# Patient Record
Sex: FEMALE | Race: WHITE
[De-identification: names, ages, dates, MRNs, and addresses within clinical notes are randomized per-mention and may not be internally consistent; named-entity substitution may affect disease eponyms.]

---

## 2020-10-29 PROBLEM — Z00.00 ENCOUNTER FOR PREVENTIVE HEALTH EXAMINATION: Status: ACTIVE | Noted: 2020-10-29

## 2020-11-17 ENCOUNTER — APPOINTMENT (OUTPATIENT)
Dept: INTERNAL MEDICINE | Facility: CLINIC | Age: 65
End: 2020-11-17
Payer: COMMERCIAL

## 2020-11-17 VITALS
BODY MASS INDEX: 30.12 KG/M2 | SYSTOLIC BLOOD PRESSURE: 126 MMHG | DIASTOLIC BLOOD PRESSURE: 88 MMHG | HEIGHT: 63 IN | WEIGHT: 170 LBS

## 2020-11-17 DIAGNOSIS — U07.1 COVID-19: ICD-10-CM

## 2020-11-17 DIAGNOSIS — H01.009 UNSPECIFIED BLEPHARITIS UNSPECIFIED EYE, UNSPECIFIED EYELID: ICD-10-CM

## 2020-11-17 DIAGNOSIS — Z78.9 OTHER SPECIFIED HEALTH STATUS: ICD-10-CM

## 2020-11-17 DIAGNOSIS — H00.019 HORDEOLUM EXTERNUM UNSPECIFIED EYE, UNSPECIFIED EYELID: ICD-10-CM

## 2020-11-17 DIAGNOSIS — Z87.891 PERSONAL HISTORY OF NICOTINE DEPENDENCE: ICD-10-CM

## 2020-11-17 DIAGNOSIS — Z82.49 FAMILY HISTORY OF ISCHEMIC HEART DISEASE AND OTHER DISEASES OF THE CIRCULATORY SYSTEM: ICD-10-CM

## 2020-11-17 DIAGNOSIS — Z87.39 PERSONAL HISTORY OF OTHER DISEASES OF THE MUSCULOSKELETAL SYSTEM AND CONNECTIVE TISSUE: ICD-10-CM

## 2020-11-17 PROCEDURE — 99213 OFFICE O/P EST LOW 20 MIN: CPT

## 2020-11-17 NOTE — REVIEW OF SYSTEMS
[Discharge] : discharge [Pain] : pain [Redness] : redness [Itching] : itching [Fever] : no fever [Earache] : no earache [Nasal Discharge] : no nasal discharge

## 2020-11-17 NOTE — HISTORY OF PRESENT ILLNESS
[de-identified] : 66 y/o female is in the office for left eye irritation, swelling, and redness for 3 days.She has been using warm compresses with some improvement.

## 2020-11-17 NOTE — PHYSICAL EXAM
[Normal] : no acute distress, well nourished, well developed and well-appearing [PERRL] : pupils equal round and reactive to light [EOMI] : extraocular movements intact [Normal Outer Ear/Nose] : the outer ears and nose were normal in appearance [Normal Oropharynx] : the oropharynx was normal [No Lymphadenopathy] : no lymphadenopathy [Scoliosis] : scoliosis [de-identified] : Left lower lid inflammed and swollen

## 2020-12-31 ENCOUNTER — APPOINTMENT (OUTPATIENT)
Dept: INTERNAL MEDICINE | Facility: CLINIC | Age: 65
End: 2020-12-31
Payer: MEDICAID

## 2020-12-31 VITALS
DIASTOLIC BLOOD PRESSURE: 70 MMHG | HEIGHT: 63 IN | SYSTOLIC BLOOD PRESSURE: 130 MMHG | WEIGHT: 170 LBS | BODY MASS INDEX: 30.12 KG/M2

## 2020-12-31 DIAGNOSIS — L03.90 CELLULITIS, UNSPECIFIED: ICD-10-CM

## 2020-12-31 PROCEDURE — 99213 OFFICE O/P EST LOW 20 MIN: CPT

## 2020-12-31 PROCEDURE — 99072 ADDL SUPL MATRL&STAF TM PHE: CPT

## 2020-12-31 RX ORDER — DOXYCYCLINE HYCLATE 100 MG/1
100 CAPSULE ORAL
Qty: 30 | Refills: 0 | Status: ACTIVE | COMMUNITY
Start: 2020-12-31 | End: 1900-01-01

## 2020-12-31 NOTE — REVIEW OF SYSTEMS
[Fever] : no fever [Chest Pain] : no chest pain [Shortness Of Breath] : no shortness of breath [Abdominal Pain] : no abdominal pain [Skin Rash] : skin rash

## 2020-12-31 NOTE — HISTORY OF PRESENT ILLNESS
[de-identified] : 64 y/o female presents for follow up and prescription renewals. Pt also c/o right thigh redness and swelling for 3 weeks.  She had what she thought was infected hair follicle that she treated topically and with doxycycline for about 5 days.  It has slowly but steadily improved.

## 2020-12-31 NOTE — PHYSICAL EXAM
[Normal] : no acute distress, well nourished, well developed and well-appearing [No Lymphadenopathy] : no lymphadenopathy [Clear to Auscultation] : lungs were clear to auscultation bilaterally [Normal Rate] : normal rate  [Regular Rhythm] : with a regular rhythm [de-identified] : Right thigh with firm nontender S/Q swelling

## 2020-12-31 NOTE — PLAN
[FreeTextEntry1] : The right thigh infection seems to be resolving.\par She was given doxycycline 1 mg #20 to have if it seems like it is worsening again.\par \par Norco 7.5 #120 renewed

## 2021-01-29 ENCOUNTER — RX RENEWAL (OUTPATIENT)
Age: 66
End: 2021-01-29

## 2021-02-19 ENCOUNTER — APPOINTMENT (OUTPATIENT)
Dept: INTERNAL MEDICINE | Facility: CLINIC | Age: 66
End: 2021-02-19

## 2021-02-25 ENCOUNTER — APPOINTMENT (OUTPATIENT)
Dept: INTERNAL MEDICINE | Facility: CLINIC | Age: 66
End: 2021-02-25
Payer: COMMERCIAL

## 2021-02-25 VITALS
DIASTOLIC BLOOD PRESSURE: 82 MMHG | SYSTOLIC BLOOD PRESSURE: 142 MMHG | BODY MASS INDEX: 30.12 KG/M2 | HEIGHT: 63 IN | WEIGHT: 170 LBS

## 2021-02-25 PROCEDURE — 99072 ADDL SUPL MATRL&STAF TM PHE: CPT

## 2021-02-25 PROCEDURE — 99214 OFFICE O/P EST MOD 30 MIN: CPT

## 2021-02-25 NOTE — PLAN
[FreeTextEntry1] : Chronic back pain/scoliosis/fibromyalgia–symptoms have been stable and Vicodin 7.5 mg #120 was renewed.\par \par Asthma–her symptoms have been well controlled lately and she will continue with Advair, Ventolin and Singulair.\par \par Paroxysmal A–fib–she continues with verapamil and is scheduled to see an arrhythmia specialist soon.\par \par She has a history of Covid in March 2020.  Feels that she has recovered but it did take an extended period of time.

## 2021-02-25 NOTE — REVIEW OF SYSTEMS
[Palpitations] : palpitations [Joint Pain] : joint pain [Joint Stiffness] : joint stiffness [Back Pain] : back pain [Fever] : no fever [Chills] : no chills [Chest Pain] : no chest pain [Shortness Of Breath] : no shortness of breath [Wheezing] : no wheezing [Cough] : no cough [Joint Swelling] : no joint swelling [Muscle Weakness] : no muscle weakness [Headache] : no headache [Dizziness] : no dizziness [Fainting] : no fainting

## 2021-02-25 NOTE — HISTORY OF PRESENT ILLNESS
[de-identified] : 66 y/o female presents for follow up and prescription renewal.  She has a history of chronic pain secondary to back arthritis, scoliosis as well as fibromyalgia.  Her pain has been well controlled on Vicodin on an as-needed basis.\par Her asthma has been controlled with Advair and Ventolin as well as Singulair daily.\par She continues to see the cardiologist for paroxysmal A-. fib and A- flutter and is scheduled to see an arrhythmia specialist soon to discuss the option of ablation.  She is on verapamil and generally has episodes on a 1 to 2-week basis.

## 2021-03-30 ENCOUNTER — APPOINTMENT (OUTPATIENT)
Dept: INTERNAL MEDICINE | Facility: CLINIC | Age: 66
End: 2021-03-30
Payer: MEDICAID

## 2021-03-30 VITALS
SYSTOLIC BLOOD PRESSURE: 130 MMHG | DIASTOLIC BLOOD PRESSURE: 90 MMHG | WEIGHT: 170 LBS | HEIGHT: 63 IN | BODY MASS INDEX: 30.12 KG/M2

## 2021-03-30 VITALS — SYSTOLIC BLOOD PRESSURE: 130 MMHG | DIASTOLIC BLOOD PRESSURE: 84 MMHG

## 2021-03-30 PROCEDURE — 99214 OFFICE O/P EST MOD 30 MIN: CPT

## 2021-03-30 PROCEDURE — 99072 ADDL SUPL MATRL&STAF TM PHE: CPT

## 2021-03-30 RX ORDER — ALBUTEROL 90 MCG
AEROSOL (GRAM) INHALATION
Refills: 0 | Status: DISCONTINUED | COMMUNITY
End: 2021-03-30

## 2021-03-30 RX ORDER — PREDNISONE 10 MG/1
10 TABLET ORAL
Qty: 50 | Refills: 0 | Status: ACTIVE | COMMUNITY
Start: 2021-03-30 | End: 1900-01-01

## 2021-03-30 NOTE — HISTORY OF PRESENT ILLNESS
[de-identified] : 66 y/o female presents for follow-up of chronic back pain as well as her allergies/asthma symptoms.  There have been somewhat worse lately and needs refill on her medications.  She been seeing a cardiologist for her paroxysmal atrial fibrillation and was referred to an electrophysiologist who felt an ablation was not needed but her cardiologist has now sent the for a second opinion to someone else.

## 2021-03-30 NOTE — PHYSICAL EXAM
[No Lymphadenopathy] : no lymphadenopathy [Clear to Auscultation] : lungs were clear to auscultation bilaterally [Normal Rate] : normal rate  [Regular Rhythm] : with a regular rhythm [No Edema] : there was no peripheral edema [No Joint Swelling] : no joint swelling [No Focal Deficits] : no focal deficits [Normal] : affect was normal and insight and judgment were intact

## 2021-03-30 NOTE — REVIEW OF SYSTEMS
[Joint Pain] : joint pain [Muscle Pain] : muscle pain [Back Pain] : back pain [Headache] : headache [Fever] : no fever [Chills] : no chills [Chest Pain] : no chest pain [Palpitations] : no palpitations [Lower Ext Edema] : no lower extremity edema [Shortness Of Breath] : no shortness of breath [Joint Swelling] : no joint swelling [Muscle Weakness] : no muscle weakness [Skin Rash] : no skin rash [Dizziness] : no dizziness [Fainting] : no fainting

## 2021-03-30 NOTE — PLAN
[FreeTextEntry1] : History of fibromyalgia/scoliosis/chronic pain–her symptoms have been relatively stable but do seem to fluctuate based on the amount of time she works and spends on her feet.  Norco 7.5 #120 was renewed.\par \par Asthma/allergies she has somewhat increased symptoms lately but she will continue with albuterol HFA/Singulair 10 mg/Symbicort or Advair and will add Patanol drops for eye symptoms.  She is also given a refill for prednisone 10 mg #30 that she uses when needed.\par \par History of paroxysmal atrial fibrillation–she will follow up with a cardiologist and electrophysiologist.  Verapamil  was renewed.

## 2021-06-02 ENCOUNTER — APPOINTMENT (OUTPATIENT)
Dept: INTERNAL MEDICINE | Facility: CLINIC | Age: 66
End: 2021-06-02
Payer: COMMERCIAL

## 2021-06-02 VITALS — WEIGHT: 173 LBS | BODY MASS INDEX: 30.65 KG/M2 | HEIGHT: 63 IN

## 2021-06-02 VITALS — SYSTOLIC BLOOD PRESSURE: 134 MMHG | DIASTOLIC BLOOD PRESSURE: 84 MMHG

## 2021-06-02 PROCEDURE — 99072 ADDL SUPL MATRL&STAF TM PHE: CPT

## 2021-06-02 PROCEDURE — 36415 COLL VENOUS BLD VENIPUNCTURE: CPT

## 2021-06-02 PROCEDURE — 99396 PREV VISIT EST AGE 40-64: CPT | Mod: 25

## 2021-06-02 NOTE — HEALTH RISK ASSESSMENT
[No] : In the past 12 months have you used drugs other than those required for medical reasons? No [No falls in past year] : Patient reported no falls in the past year [0] : 2) Feeling down, depressed, or hopeless: Not at all (0) [Patient reported mammogram was normal] : Patient reported mammogram was normal [] : No [[id number]] : 0 [MammogramDate] : 08/19

## 2021-06-02 NOTE — PHYSICAL EXAM
[No Acute Distress] : no acute distress [No JVD] : no jugular venous distention [No Lymphadenopathy] : no lymphadenopathy [Thyroid Normal, No Nodules] : the thyroid was normal and there were no nodules present [Clear to Auscultation] : lungs were clear to auscultation bilaterally [Normal] : normal rate, regular rhythm, normal S1 and S2 and no murmur heard [No Carotid Bruits] : no carotid bruits [No Edema] : there was no peripheral edema [Non Tender] : non-tender [No Joint Swelling] : no joint swelling [No Focal Deficits] : no focal deficits [Alert and Oriented x3] : oriented to person, place, and time [de-identified] : Scoliosis changes

## 2021-06-02 NOTE — PLAN
[FreeTextEntry1] : Her exam is essentially unchanged and significant mostly for her significant scoliosis.\par Fasting labs including thyroid function, A1c, vitamin D and Covid antibody were sent.\par \par Chronic back pain/scoliosis/fibromyalgia–her pain has been relatively stable but it does wax and wane based on her level of activity.  Norco 7.5 #120 was renewed.  She uses medication as prescribed without any problems in the past.\par \par Asthma symptoms are well controlled with Singulair, Advair or Symbicort and albuterol as needed.\par \par Paroxysmal A. fib–she has had no episodes recently and remains on verapamil ER one eighty daily.  She follows with a cardiologist and is also scheduled to see a different arrhythmia specialist to discuss the possibility of ablation.\par \par Colonoscopy was again discussed and suggested.  She does intend to do in the near future.\par \par Yearly GYN/mammography was again discussed and encouraged.  Last mammography was about 2 years ago that was normal.

## 2021-06-03 LAB
25(OH)D3 SERPL-MCNC: 58 NG/ML
ALBUMIN SERPL ELPH-MCNC: 4.1 G/DL
ALP BLD-CCNC: 83 U/L
ALT SERPL-CCNC: 14 U/L
ANION GAP SERPL CALC-SCNC: 12 MMOL/L
AST SERPL-CCNC: 16 U/L
BASOPHILS # BLD AUTO: 0.06 K/UL
BASOPHILS NFR BLD AUTO: 0.8 %
BILIRUB SERPL-MCNC: 0.4 MG/DL
BUN SERPL-MCNC: 10 MG/DL
CALCIUM SERPL-MCNC: 9.3 MG/DL
CHLORIDE SERPL-SCNC: 102 MMOL/L
CHOLEST SERPL-MCNC: 179 MG/DL
CO2 SERPL-SCNC: 25 MMOL/L
COVID-19 NUCLEOCAPSID  GAM ANTIBODY INTERPRETATION: POSITIVE
CREAT SERPL-MCNC: 0.5 MG/DL
EOSINOPHIL # BLD AUTO: 0.12 K/UL
EOSINOPHIL NFR BLD AUTO: 1.6 %
ESTIMATED AVERAGE GLUCOSE: 111 MG/DL
GLUCOSE SERPL-MCNC: 82 MG/DL
HBA1C MFR BLD HPLC: 5.5 %
HCT VFR BLD CALC: 39 %
HDLC SERPL-MCNC: 73 MG/DL
HGB BLD-MCNC: 12.7 G/DL
IMM GRANULOCYTES NFR BLD AUTO: 0.1 %
LDLC SERPL CALC-MCNC: 89 MG/DL
LYMPHOCYTES # BLD AUTO: 1.66 K/UL
LYMPHOCYTES NFR BLD AUTO: 22.2 %
MAN DIFF?: NORMAL
MCHC RBC-ENTMCNC: 30.4 PG
MCHC RBC-ENTMCNC: 32.6 GM/DL
MCV RBC AUTO: 93.3 FL
MONOCYTES # BLD AUTO: 0.5 K/UL
MONOCYTES NFR BLD AUTO: 6.7 %
NEUTROPHILS # BLD AUTO: 5.13 K/UL
NEUTROPHILS NFR BLD AUTO: 68.6 %
NONHDLC SERPL-MCNC: 106 MG/DL
PLATELET # BLD AUTO: 264 K/UL
POTASSIUM SERPL-SCNC: 4 MMOL/L
PROT SERPL-MCNC: 6.2 G/DL
RBC # BLD: 4.18 M/UL
RBC # FLD: 13.1 %
SARS-COV-2 AB SERPL QL IA: 53.5 INDEX
SODIUM SERPL-SCNC: 139 MMOL/L
T3 SERPL-MCNC: 124 NG/DL
T4 SERPL-MCNC: 6.8 UG/DL
TRIGL SERPL-MCNC: 84 MG/DL
TSH SERPL-ACNC: 0.97 UIU/ML
WBC # FLD AUTO: 7.48 K/UL

## 2021-07-19 ENCOUNTER — APPOINTMENT (OUTPATIENT)
Dept: INTERNAL MEDICINE | Facility: CLINIC | Age: 66
End: 2021-07-19

## 2021-07-21 ENCOUNTER — APPOINTMENT (OUTPATIENT)
Dept: INTERNAL MEDICINE | Facility: CLINIC | Age: 66
End: 2021-07-21
Payer: COMMERCIAL

## 2021-07-21 VITALS — BODY MASS INDEX: 30.65 KG/M2 | HEIGHT: 63 IN | WEIGHT: 173 LBS

## 2021-07-21 DIAGNOSIS — R06.00 DYSPNEA, UNSPECIFIED: ICD-10-CM

## 2021-07-21 PROCEDURE — 99214 OFFICE O/P EST MOD 30 MIN: CPT

## 2021-07-21 PROCEDURE — 99072 ADDL SUPL MATRL&STAF TM PHE: CPT

## 2021-07-21 NOTE — REVIEW OF SYSTEMS
[Fever] : no fever [Itching] : itching [Nasal Discharge] : nasal discharge [Chest Pain] : no chest pain [Shortness Of Breath] : shortness of breath [Wheezing] : no wheezing [Cough] : cough [Abdominal Pain] : no abdominal pain [Joint Pain] : joint pain [Back Pain] : back pain [Headache] : no headache [Dizziness] : no dizziness [FreeTextEntry6] : Occasional yellow sputum

## 2021-07-21 NOTE — ASSESSMENT
[FreeTextEntry1] : Chronic back pain/from myalgia–Norco 7.5 #120 was renewed.  Pain pattern has remained essentially unchanged but she does have occasional flareups.  She gets good relief with the medication and she uses without any difficulties in the past.\par \par Asthma/dyspnea–her exam is unremarkable without any audible wheezing.  She was told some of her shortness of breath could be post Covid related.  Her recent cardiac work-up was negative.  She is also told that due to her scoliosis she might have some diminished lung function and could see a pulmonologist to have it evaluated but she wants to hold off for the time being.  For now she will continue to use her Advair and albuterol and Singulair.\par \par Allergic rhinitis/conjunctivitis–she is going to continue with Pataday ophthalmic solution daily along with Flonase as needed.  She has gotten good relief both these medications in the past.\par \par Paroxysmal atrial fibrillation–she still has occasional episodes of a–fib but they seem generally well controlled.  He does have an appointment to see an arrhythmia specialist but she is not interested at this time of entertaining an ablation.\par \par A total of 30 minutes was spent on this encounter.

## 2021-07-21 NOTE — HISTORY OF PRESENT ILLNESS
[de-identified] : 65 y/o presents for follow up and prescription renewal.\par She also complains of some intermittent shortness of breath not associated with any chest pain or episodes of atrial fibrillation.  She does have an ongoing cough and postnasal drip that is occasionally productive of yellowish sputum.  She denies any fever chills or increased wheezing as of late.  Her asthma has been well controlled.

## 2021-07-21 NOTE — PHYSICAL EXAM
[No Acute Distress] : no acute distress [No JVD] : no jugular venous distention [No Respiratory Distress] : no respiratory distress  [Clear to Auscultation] : lungs were clear to auscultation bilaterally [Normal Rate] : normal rate  [Regular Rhythm] : with a regular rhythm [Normal S1, S2] : normal S1 and S2 [No Edema] : there was no peripheral edema

## 2021-07-27 ENCOUNTER — TRANSCRIPTION ENCOUNTER (OUTPATIENT)
Age: 66
End: 2021-07-27

## 2021-07-29 ENCOUNTER — NON-APPOINTMENT (OUTPATIENT)
Age: 66
End: 2021-07-29

## 2021-08-02 ENCOUNTER — APPOINTMENT (OUTPATIENT)
Dept: INTERNAL MEDICINE | Facility: CLINIC | Age: 66
End: 2021-08-02
Payer: COMMERCIAL

## 2021-08-02 VITALS
DIASTOLIC BLOOD PRESSURE: 80 MMHG | SYSTOLIC BLOOD PRESSURE: 120 MMHG | WEIGHT: 173 LBS | BODY MASS INDEX: 30.65 KG/M2 | OXYGEN SATURATION: 97 % | HEIGHT: 63 IN

## 2021-08-02 DIAGNOSIS — J98.01 ACUTE BRONCHOSPASM: ICD-10-CM

## 2021-08-02 PROCEDURE — 99213 OFFICE O/P EST LOW 20 MIN: CPT

## 2021-08-02 NOTE — PHYSICAL EXAM
[No Acute Distress] : no acute distress [No Lymphadenopathy] : no lymphadenopathy [No Respiratory Distress] : no respiratory distress  [Normal Rate] : normal rate  [Regular Rhythm] : with a regular rhythm [No Edema] : there was no peripheral edema [de-identified] : Bilateral upper expiratory wheezing

## 2021-08-02 NOTE — REVIEW OF SYSTEMS
[Shortness Of Breath] : shortness of breath [Wheezing] : wheezing [Cough] : cough [Headache] : headache [Fever] : no fever [Chills] : no chills [Chest Pain] : no chest pain [Dyspnea on Exertion] : no dyspnea on exertion [Abdominal Pain] : no abdominal pain [Diarrhea] : diarrhea [Dizziness] : no dizziness [Fainting] : no fainting

## 2021-08-02 NOTE — HISTORY OF PRESENT ILLNESS
[de-identified] : 67 y/o presents complaining of persistent wheezing over the past 10 days.  She started prednisone 40 mg on 725 and then was seen at a walk-in urgent care on 727 and had a chest x-ray done that revealed mostly atelectasis but no obvious infiltrate or consolidation.  She continue with prednisone 40 mg for another 2 days and is now tapered down to 10 mg.  She is also been on doxycycline for 1 week with no change.  The cough and wheezing has persisted.  She denies any fever or chills or significant sputum.  She also recently found out that there was mold in her ceiling which possibly may be contributing.

## 2021-08-02 NOTE — ASSESSMENT
[FreeTextEntry1] : Tracheobronchitis with persistent bronchospasm–she was told is possibly due to an allergy reaction to mold or could have been viral in origin.  She is taking the doxycycline for 1 week with no change and was told to stop at this point.  For the persistent bronchospasm she is going to increase prednisone to 60 mg for the next 3 to 4 days and only start to decrease it when she feels the wheezing has improved.  She is going to call if she has any issues.  Once she improves she will then start a slow taper by 10 mg every 3 to 4 days based on response.  She will follow-up if there is any problems.

## 2021-09-01 ENCOUNTER — APPOINTMENT (OUTPATIENT)
Dept: INTERNAL MEDICINE | Facility: CLINIC | Age: 66
End: 2021-09-01
Payer: COMMERCIAL

## 2021-09-01 VITALS
BODY MASS INDEX: 30.65 KG/M2 | SYSTOLIC BLOOD PRESSURE: 122 MMHG | HEIGHT: 63 IN | WEIGHT: 173 LBS | DIASTOLIC BLOOD PRESSURE: 60 MMHG

## 2021-09-01 DIAGNOSIS — K21.9 GASTRO-ESOPHAGEAL REFLUX DISEASE W/OUT ESOPHAGITIS: ICD-10-CM

## 2021-09-01 PROCEDURE — 99213 OFFICE O/P EST LOW 20 MIN: CPT

## 2021-09-01 NOTE — REVIEW OF SYSTEMS
[Heartburn] : heartburn [Fever] : no fever [Chills] : no chills [Chest Pain] : no chest pain [Shortness Of Breath] : no shortness of breath [Wheezing] : no wheezing [Cough] : no cough [Dyspnea on Exertion] : no dyspnea on exertion [Abdominal Pain] : no abdominal pain [Diarrhea] : diarrhea [Headache] : no headache [Dizziness] : no dizziness [Fainting] : no fainting

## 2021-09-01 NOTE — HISTORY OF PRESENT ILLNESS
[de-identified] : 67 y/o presents for follow up and prescription renewal. \par Her recent asthma exacerbation has resolved with a course of prednisone. She did experience some reflux symptoms while taking prednisone.  She has been feeling generally well otherwise other than some recent headaches and ongoing allergy symptoms.  Back pain issues are essentially unchanged.

## 2021-09-01 NOTE — PHYSICAL EXAM
[No Acute Distress] : no acute distress [No Lymphadenopathy] : no lymphadenopathy [No Respiratory Distress] : no respiratory distress  [Clear to Auscultation] : lungs were clear to auscultation bilaterally [Normal Rate] : normal rate  [Regular Rhythm] : with a regular rhythm [No Edema] : there was no peripheral edema

## 2021-09-01 NOTE — ASSESSMENT
[FreeTextEntry1] : Chronic back pain/fibromyalgia/scoliosis–Norco 7.5 #120 was renewed.  There has been no significant change in her symptom pattern.\par \par Recent GERD–she was told is very possible prednisone caused some increased reflux symptoms and she was told to use Pepcid 20 mg twice daily as needed.\par \par Paroxysmal atrial fibrillation–she has had recent episode that lasted a few hours but resolved spontaneously.  She has an appointment to see electrophysiologist in the next 2 weeks.

## 2021-09-21 ENCOUNTER — APPOINTMENT (OUTPATIENT)
Dept: INTERNAL MEDICINE | Facility: CLINIC | Age: 66
End: 2021-09-21
Payer: COMMERCIAL

## 2021-09-21 VITALS — DIASTOLIC BLOOD PRESSURE: 70 MMHG | SYSTOLIC BLOOD PRESSURE: 130 MMHG

## 2021-09-21 DIAGNOSIS — H10.10 ACUTE ATOPIC CONJUNCTIVITIS, UNSPECIFIED EYE: ICD-10-CM

## 2021-09-21 PROCEDURE — 99213 OFFICE O/P EST LOW 20 MIN: CPT

## 2021-09-21 RX ORDER — AZELASTINE HYDROCHLORIDE 137 UG/1
0.1 SPRAY, METERED NASAL
Qty: 1 | Refills: 2 | Status: ACTIVE | COMMUNITY
Start: 2021-09-21 | End: 1900-01-01

## 2021-09-21 NOTE — HISTORY OF PRESENT ILLNESS
[FreeTextEntry8] : Patient presents with c/o persistent/increased allergy symptoms with congestion, postnasal drip, irritated eyes.  She denies any wheezing and overall drainage/mucus is clear.\par Separately she also had recent evaluation by an electrophysiology specialist and for now she is going to remain on verapamil for rate control.  He did not feel there was any need for an ablation at this time.

## 2021-09-21 NOTE — PHYSICAL EXAM
[No Acute Distress] : no acute distress [Clear to Auscultation] : lungs were clear to auscultation bilaterally [Normal Rate] : normal rate  [Regular Rhythm] : with a regular rhythm [No Edema] : there was no peripheral edema [de-identified] : Sclera/conjunctiva slightly injected

## 2021-09-21 NOTE — REVIEW OF SYSTEMS
[Itching] : itching [Nasal Discharge] : nasal discharge [Postnasal Drip] : postnasal drip [Fever] : no fever [Chills] : no chills [Chest Pain] : no chest pain [Shortness Of Breath] : no shortness of breath [Wheezing] : no wheezing

## 2021-09-21 NOTE — ASSESSMENT
[FreeTextEntry1] : Allergic rhinitis/conjunctivitis–she has had minimal improvement with Singulair and her nasal sprays.\par She is going to use prednisone 20 mg daily for next 2 to 3 days and then decrease it down to 10 for a few more days and then stop.\par \par Chronic back pain–is no change in his symptoms.  Vicodin 7.5 #120 was renewed.  She is knows it is early and she will probably have to wait about a week to build to fill the prescription.  She has not run out of pills she would just like to refill it now that she is here.\par \par Paroxysmal atrial fibrillation–she had follow-up with her electrophysiology specialist.  At this point she is going continue with verapamil for rate control.  He felt there was no indication for an ablation.  She is going to remain on aspirin on a daily basis as well.

## 2021-10-14 ENCOUNTER — RX RENEWAL (OUTPATIENT)
Age: 66
End: 2021-10-14

## 2021-11-08 ENCOUNTER — NON-APPOINTMENT (OUTPATIENT)
Age: 66
End: 2021-11-08

## 2021-11-08 ENCOUNTER — APPOINTMENT (OUTPATIENT)
Dept: INTERNAL MEDICINE | Facility: CLINIC | Age: 66
End: 2021-11-08
Payer: COMMERCIAL

## 2021-11-08 VITALS
BODY MASS INDEX: 30.65 KG/M2 | WEIGHT: 173 LBS | HEIGHT: 63 IN | DIASTOLIC BLOOD PRESSURE: 70 MMHG | SYSTOLIC BLOOD PRESSURE: 100 MMHG

## 2021-11-08 DIAGNOSIS — J30.9 ALLERGIC RHINITIS, UNSPECIFIED: ICD-10-CM

## 2021-11-08 DIAGNOSIS — L30.9 DERMATITIS, UNSPECIFIED: ICD-10-CM

## 2021-11-08 PROCEDURE — 99213 OFFICE O/P EST LOW 20 MIN: CPT

## 2021-11-08 RX ORDER — OLOPATADINE HYDROCHLORIDE 2 MG/ML
0.2 SOLUTION OPHTHALMIC DAILY
Qty: 1 | Refills: 2 | Status: DISCONTINUED | COMMUNITY
Start: 2021-03-30 | End: 2021-11-08

## 2021-11-08 RX ORDER — FLUTICASONE PROPIONATE AND SALMETEROL XINAFOATE 115; 21 UG/1; UG/1
115-21 AEROSOL, METERED RESPIRATORY (INHALATION) TWICE DAILY
Qty: 3 | Refills: 1 | Status: ACTIVE | COMMUNITY
Start: 2020-10-29 | End: 1900-01-01

## 2021-11-08 NOTE — HISTORY OF PRESENT ILLNESS
[de-identified] : 67 y/o presents for follow up and prescription renewal. \par She has a history of intermittent asthma/allergies, chronic back pain secondary to arthritis and scoliosis, fibromyalgia, and paroxysmal atrial fibrillation.  Her asthma is well controlled since her recent exacerbation in August after a course of prednisone.  She also had recent evaluation by a second electrophysiologist who did not suggest an ablation but to continue with medical management with verapamil but she states she has been off it for over a month and has had no recurrence.\par

## 2021-11-08 NOTE — ASSESSMENT
[FreeTextEntry1] : Chronic back pain/scoliosis/fibromyalgia–there has been no significant change in his symptoms.  Due to seem to wax and wane based on her level of activity and sometimes even the time of the year.  Norco 7.5 #120 was renewed.\par \par Asthma/allergies–symptoms have been well controlled as of late she will continue with Advair  twice daily as needed as well as Singulair 10 mg daily and Flonase nasal spray as needed.\par \par Paroxysmal atrial fibrillation–she is currently remained in sinus rhythm and is asymptomatic.  She is not taking verapamil at present but will restart it if the atrial fibrillation recurs.\par \par Hand dermatitis–she will use Elocon cream along with moisturizing creams as needed.

## 2021-11-08 NOTE — REVIEW OF SYSTEMS
[Joint Pain] : joint pain [Joint Stiffness] : joint stiffness [Back Pain] : back pain [Skin Rash] : skin rash [Fever] : no fever [Chest Pain] : no chest pain [Palpitations] : no palpitations [Shortness Of Breath] : no shortness of breath [Wheezing] : no wheezing [Cough] : no cough [Abdominal Pain] : no abdominal pain [Joint Swelling] : no joint swelling [Muscle Weakness] : no muscle weakness [Headache] : no headache [Dizziness] : no dizziness

## 2021-11-08 NOTE — PHYSICAL EXAM
[No Acute Distress] : no acute distress [No JVD] : no jugular venous distention [Clear to Auscultation] : lungs were clear to auscultation bilaterally [Normal Rate] : normal rate  [Regular Rhythm] : with a regular rhythm [Normal S1, S2] : normal S1 and S2 [No Edema] : there was no peripheral edema [No Joint Swelling] : no joint swelling [No Focal Deficits] : no focal deficits [Alert and Oriented x3] : oriented to person, place, and time

## 2021-12-08 ENCOUNTER — APPOINTMENT (OUTPATIENT)
Dept: INTERNAL MEDICINE | Facility: CLINIC | Age: 66
End: 2021-12-08
Payer: COMMERCIAL

## 2021-12-08 VITALS — WEIGHT: 173 LBS | BODY MASS INDEX: 30.65 KG/M2 | HEIGHT: 63 IN

## 2021-12-08 VITALS — SYSTOLIC BLOOD PRESSURE: 134 MMHG | DIASTOLIC BLOOD PRESSURE: 80 MMHG

## 2021-12-08 DIAGNOSIS — J40 BRONCHITIS, NOT SPECIFIED AS ACUTE OR CHRONIC: ICD-10-CM

## 2021-12-08 PROCEDURE — 99213 OFFICE O/P EST LOW 20 MIN: CPT

## 2021-12-08 RX ORDER — PREDNISONE 20 MG/1
20 TABLET ORAL
Qty: 30 | Refills: 0 | Status: ACTIVE | COMMUNITY
Start: 2021-08-02 | End: 1900-01-01

## 2021-12-08 NOTE — REVIEW OF SYSTEMS
[Fever] : no fever [Chills] : no chills [Palpitations] : no palpitations [Shortness Of Breath] : no shortness of breath [Wheezing] : wheezing [Cough] : cough [Dyspnea on Exertion] : no dyspnea on exertion

## 2021-12-08 NOTE — HISTORY OF PRESENT ILLNESS
[de-identified] : 65 y/o presents for follow up and and also complaining of a 1 week history of congestion cough along with minimal mostly clear sputum production.  She denies any obvious wheezing.  She also has no fever or chills or chest pain or significant shortness of breath.

## 2021-12-08 NOTE — PHYSICAL EXAM
[No Acute Distress] : no acute distress [No JVD] : no jugular venous distention [No Lymphadenopathy] : no lymphadenopathy [No Respiratory Distress] : no respiratory distress  [Clear to Auscultation] : lungs were clear to auscultation bilaterally [Normal Rate] : normal rate  [Regular Rhythm] : with a regular rhythm [de-identified] : Course breath sounds

## 2021-12-08 NOTE — ASSESSMENT
[FreeTextEntry1] : Tracheobronchitis–she was told this seems mostly viral in origin.  She had something similar a few months back and responded very well to a course of prednisone.  She is going to repeat that again starting with 60 mg for 2 days and then taper over the next 7-10 days as tolerated.  She has done this in the past and understands how to use the medication.\par \par History of chronic back pain/scoliosis–hydrocodone 7.5 mg #120 was renewed.\par We discussed scoliosis options and she was encouraged to make an appointment to see Dr. Templeton for an evaluation.  She has not seen a spine orthopedist in many years.

## 2022-01-04 ENCOUNTER — RX RENEWAL (OUTPATIENT)
Age: 67
End: 2022-01-04

## 2022-01-04 RX ORDER — ALBUTEROL SULFATE 2.5 MG/3ML
(2.5 MG/3ML) SOLUTION RESPIRATORY (INHALATION) 4 TIMES DAILY
Qty: 75 | Refills: 0 | Status: ACTIVE | COMMUNITY
Start: 2021-07-29 | End: 1900-01-01

## 2022-01-05 LAB — SARS-COV-2 N GENE NPH QL NAA+PROBE: NOT DETECTED

## 2022-01-19 ENCOUNTER — APPOINTMENT (OUTPATIENT)
Dept: INTERNAL MEDICINE | Facility: CLINIC | Age: 67
End: 2022-01-19
Payer: COMMERCIAL

## 2022-01-19 VITALS — SYSTOLIC BLOOD PRESSURE: 136 MMHG | HEIGHT: 63 IN | DIASTOLIC BLOOD PRESSURE: 76 MMHG

## 2022-01-19 PROCEDURE — 99213 OFFICE O/P EST LOW 20 MIN: CPT

## 2022-01-19 NOTE — ASSESSMENT
[FreeTextEntry1] : Pain/scoliosis/fibromyalgia–pain pattern has been generally stable but it does fluctuate based on the level of her activity and the amount that she works and is on her feet.  Hydrocodone 7.5/325 #120 was renewed.  She continues to use this medication without any difficulty.\par \par Asthma–she states that she has not been using Singulair for the past few months and his symptoms have been generally well controlled but she does still use albuterol on a rare occasion.

## 2022-01-19 NOTE — PHYSICAL EXAM
[No Acute Distress] : no acute distress [No Respiratory Distress] : no respiratory distress  [Clear to Auscultation] : lungs were clear to auscultation bilaterally [Normal Rate] : normal rate  [de-identified] : Nontender to palpation

## 2022-01-19 NOTE — HISTORY OF PRESENT ILLNESS
[de-identified] : 65 y/o presents for follow up and prescription renewal.\par She has a history of chronic back pain secondary to arthritis and scoliosis as well as fibromyalgia.\par She has been doing generally well without any significant change in her pain pattern.

## 2022-06-21 ENCOUNTER — APPOINTMENT (OUTPATIENT)
Dept: INTERNAL MEDICINE | Facility: CLINIC | Age: 67
End: 2022-06-21

## 2022-06-22 RX ORDER — VERAPAMIL HYDROCHLORIDE 180 MG/1
180 CAPSULE, DELAYED RELEASE PELLETS ORAL
Qty: 90 | Refills: 1 | Status: DISCONTINUED | COMMUNITY
End: 2022-06-22

## 2022-07-05 ENCOUNTER — NON-APPOINTMENT (OUTPATIENT)
Age: 67
End: 2022-07-05

## 2022-07-05 ENCOUNTER — APPOINTMENT (OUTPATIENT)
Dept: INTERNAL MEDICINE | Facility: CLINIC | Age: 67
End: 2022-07-05

## 2022-07-05 VITALS
DIASTOLIC BLOOD PRESSURE: 78 MMHG | HEIGHT: 63 IN | SYSTOLIC BLOOD PRESSURE: 122 MMHG | WEIGHT: 165 LBS | BODY MASS INDEX: 29.23 KG/M2

## 2022-07-05 DIAGNOSIS — Z86.16 PERSONAL HISTORY OF COVID-19: ICD-10-CM

## 2022-07-05 PROCEDURE — 99397 PER PM REEVAL EST PAT 65+ YR: CPT | Mod: 25

## 2022-07-05 PROCEDURE — 36415 COLL VENOUS BLD VENIPUNCTURE: CPT

## 2022-07-05 NOTE — HISTORY OF PRESENT ILLNESS
[de-identified] : 67-year-old female presents for annual wellness visit as well as follow-up fasting labs and prescription renewals.\par She has a history of fibromyalgia, asthma, paroxysmal atrial fibrillation, chronic back pain/scoliosis.\par She recently increased verapamil to 240 mg daily.  She has been following with an ox physiologist as well as cardiologist.\par Has been generally well without any recent illness.  Last mammography was about 2 years ago and that was normal.  She has never done a colonoscopy

## 2022-07-05 NOTE — HEALTH RISK ASSESSMENT
[Former] : Former [5-9] : 5-9 [Yes] : Yes [2 - 4 times a month (2 pts)] : 2-4 times a month (2 points) [1 or 2 (0 pts)] : 1 or 2 (0 points) [Never (0 pts)] : Never (0 points) [No falls in past year] : Patient reported no falls in the past year [0] : 2) Feeling down, depressed, or hopeless: Not at all (0) [PHQ-2 Negative - No further assessment needed] : PHQ-2 Negative - No further assessment needed [Patient reported mammogram was normal] : Patient reported mammogram was normal [Patient declined colonoscopy] : Patient declined colonoscopy [YearQuit] : 1992 [[id number]] : 0 [MammogramDate] : 08/20 [ColonoscopyComments] : Considering doing Cologuard

## 2022-07-05 NOTE — ASSESSMENT
[FreeTextEntry1] : Her exam is unchanged.\par \par Paroxysmal atrial fibrillation–she is now presently on verapamil  mg daily and has had no further recurrence.  She continues to follow with her cardiologist/electrophysiologist.\par \par History of fibromyalgia/scoliosis/chronic back pain–symptoms essentially unchanged and they wax and wane based on her level of activity.  She continues to use Vicodin 7.5/325 as needed.\par \par Asthma–she is been generally asymptomatic and remains on Singulair 10 mg daily along with Advair/Symbicort and Proventil as needed.\par \par History of COVID–she is asking for a follow-up COVID antibody titer to be sent.  Limitations of the test were explained\par \par Last mammography was about 2 years ago.  She was given a prescription for follow-up exam.\par She is not interested in doing colonoscopy.  She is asking about Cologuard and was given a requisition.  The limitations and implications of the test results were discussed.

## 2022-07-05 NOTE — REVIEW OF SYSTEMS
[Joint Pain] : joint pain [Joint Stiffness] : joint stiffness [Back Pain] : back pain [Fever] : no fever [Chills] : no chills [Chest Pain] : no chest pain [Palpitations] : no palpitations [Lower Ext Edema] : no lower extremity edema [Shortness Of Breath] : no shortness of breath [Dyspnea on Exertion] : no dyspnea on exertion [Abdominal Pain] : no abdominal pain [Headache] : no headache [Dizziness] : no dizziness

## 2022-07-05 NOTE — PHYSICAL EXAM
[No Acute Distress] : no acute distress [No JVD] : no jugular venous distention [No Respiratory Distress] : no respiratory distress  [Clear to Auscultation] : lungs were clear to auscultation bilaterally [Normal Rate] : normal rate  [Regular Rhythm] : with a regular rhythm [Normal S1, S2] : normal S1 and S2 [No Edema] : there was no peripheral edema [Non Tender] : non-tender [Grossly Normal Strength/Tone] : grossly normal strength/tone [No Focal Deficits] : no focal deficits [de-identified] : Nontender to palpation

## 2022-07-06 LAB
ALBUMIN SERPL ELPH-MCNC: 4.5 G/DL
ALP BLD-CCNC: 78 U/L
ALT SERPL-CCNC: 15 U/L
ANION GAP SERPL CALC-SCNC: 11 MMOL/L
AST SERPL-CCNC: 15 U/L
BASOPHILS # BLD AUTO: 0.03 K/UL
BASOPHILS NFR BLD AUTO: 0.5 %
BILIRUB SERPL-MCNC: 0.3 MG/DL
BUN SERPL-MCNC: 10 MG/DL
CALCIUM SERPL-MCNC: 9.4 MG/DL
CHLORIDE SERPL-SCNC: 105 MMOL/L
CHOLEST SERPL-MCNC: 196 MG/DL
CO2 SERPL-SCNC: 24 MMOL/L
COVID-19 NUCLEOCAPSID  GAM ANTIBODY INTERPRETATION: POSITIVE
CREAT SERPL-MCNC: 0.49 MG/DL
EGFR: 105 ML/MIN/1.73M2
EOSINOPHIL # BLD AUTO: 0.1 K/UL
EOSINOPHIL NFR BLD AUTO: 1.6 %
GLUCOSE SERPL-MCNC: 96 MG/DL
HCT VFR BLD CALC: 39.1 %
HDLC SERPL-MCNC: 78 MG/DL
HGB BLD-MCNC: 12.9 G/DL
IMM GRANULOCYTES NFR BLD AUTO: 0.2 %
LDLC SERPL CALC-MCNC: 104 MG/DL
LYMPHOCYTES # BLD AUTO: 1.25 K/UL
LYMPHOCYTES NFR BLD AUTO: 19.9 %
MAN DIFF?: NORMAL
MCHC RBC-ENTMCNC: 30.2 PG
MCHC RBC-ENTMCNC: 33 GM/DL
MCV RBC AUTO: 91.6 FL
MONOCYTES # BLD AUTO: 0.5 K/UL
MONOCYTES NFR BLD AUTO: 8 %
NEUTROPHILS # BLD AUTO: 4.39 K/UL
NEUTROPHILS NFR BLD AUTO: 69.8 %
NONHDLC SERPL-MCNC: 118 MG/DL
PLATELET # BLD AUTO: 273 K/UL
POTASSIUM SERPL-SCNC: 4.3 MMOL/L
PROT SERPL-MCNC: 6.7 G/DL
RBC # BLD: 4.27 M/UL
RBC # FLD: 12.8 %
SARS-COV-2 AB SERPL QL IA: 18.3 INDEX
SODIUM SERPL-SCNC: 140 MMOL/L
TRIGL SERPL-MCNC: 72 MG/DL
WBC # FLD AUTO: 6.28 K/UL

## 2022-07-18 ENCOUNTER — RX RENEWAL (OUTPATIENT)
Age: 67
End: 2022-07-18

## 2022-09-21 NOTE — PHYSICAL EXAM
~2019, patient reports clean coronaries [Normal] : no acute distress, well nourished, well developed and well-appearing [No JVD] : no jugular venous distention [Clear to Auscultation] : lungs were clear to auscultation bilaterally [Normal Rate] : normal rate  [Regular Rhythm] : with a regular rhythm [Normal S1, S2] : normal S1 and S2 [No Edema] : there was no peripheral edema [No Joint Swelling] : no joint swelling [de-identified] : Back is nontender to palpation and she has obvious scoliosis changes

## 2022-10-17 ENCOUNTER — RX RENEWAL (OUTPATIENT)
Age: 67
End: 2022-10-17

## 2022-10-17 RX ORDER — AZELASTINE HYDROCHLORIDE 137 UG/1
137 SPRAY, METERED NASAL
Qty: 1 | Refills: 2 | Status: ACTIVE | COMMUNITY
Start: 2022-10-17 | End: 1900-01-01

## 2022-10-25 ENCOUNTER — APPOINTMENT (OUTPATIENT)
Dept: INTERNAL MEDICINE | Facility: CLINIC | Age: 67
End: 2022-10-25

## 2022-10-25 ENCOUNTER — NON-APPOINTMENT (OUTPATIENT)
Age: 67
End: 2022-10-25

## 2022-10-25 VITALS — SYSTOLIC BLOOD PRESSURE: 138 MMHG | DIASTOLIC BLOOD PRESSURE: 82 MMHG

## 2022-10-25 PROCEDURE — 99214 OFFICE O/P EST MOD 30 MIN: CPT

## 2022-10-25 RX ORDER — NEBULIZER ACCESSORIES
KIT MISCELLANEOUS
Qty: 1 | Refills: 0 | Status: ACTIVE | COMMUNITY
Start: 2021-07-29 | End: 1900-01-01

## 2022-10-25 RX ORDER — MONTELUKAST SODIUM 10 MG/1
10 TABLET, FILM COATED ORAL
Refills: 0 | Status: DISCONTINUED | COMMUNITY
End: 2022-10-25

## 2022-10-25 RX ORDER — OLOPATADINE HYDROCHLORIDE 2 MG/ML
0.2 SOLUTION OPHTHALMIC DAILY
Qty: 1 | Refills: 2 | Status: ACTIVE | COMMUNITY
Start: 2021-09-23 | End: 1900-01-01

## 2022-10-25 RX ORDER — COVID-19 MOLECULAR TEST ASSAY
KIT MISCELLANEOUS
Qty: 1 | Refills: 0 | Status: DISCONTINUED | COMMUNITY
Start: 2022-08-11

## 2022-10-25 RX ORDER — FAMOTIDINE 20 MG/1
20 TABLET, FILM COATED ORAL
Qty: 60 | Refills: 1 | Status: DISCONTINUED | COMMUNITY
Start: 2021-09-01 | End: 2022-10-25

## 2022-10-25 NOTE — HISTORY OF PRESENT ILLNESS
[de-identified] : 66 y/o female presents for follow up and prescription renewals.\par She has a history of paroxysmal atrial fibrillation, fibromyalgia, chronic back pain/scoliosis, asthma.\dhara Has been generally well without recent illness.  She has had rare episodes of atrial fibrillation.  She does have an appointment to follow-up with her electrophysiologist tomorrow.  She is presently on verapamil  mg daily.\dhara Also needs medical evaluation prior to upcoming dental work.

## 2022-10-25 NOTE — PHYSICAL EXAM
[No Acute Distress] : no acute distress [No JVD] : no jugular venous distention [No Respiratory Distress] : no respiratory distress  [Clear to Auscultation] : lungs were clear to auscultation bilaterally [Normal Rate] : normal rate  [Regular Rhythm] : with a regular rhythm [Normal S1, S2] : normal S1 and S2 [No Murmur] : no murmur heard [No Edema] : there was no peripheral edema [Grossly Normal Strength/Tone] : grossly normal strength/tone [de-identified] : Nontender to palpation

## 2022-10-25 NOTE — ASSESSMENT
[FreeTextEntry1] : Upcoming dental surgery–there are no medical contraindications to surgery.\par \par Paroxysmal atrial fibrillation–she remains generally asymptomatic on verapamil  mg daily.  She is not presently on anticoagulation but this is being considered.  She has a follow-up appointment with her electrophysiologist tomorrow.  \par \par History of fibromyalgia/scoliosis/chronic back pain–no significant change in his symptoms recently.  She continues to use Vicodin 7.5/325 as needed.  It was renewed today.\par \par History of asthma–symptoms have been generally well controlled with Singulair 10 mg daily along with Advair/Symbicort and Proventil as needed.

## 2022-12-19 RX ORDER — MOMETASONE FUROATE 1 MG/G
0.1 CREAM TOPICAL
Qty: 1 | Refills: 1 | Status: ACTIVE | COMMUNITY
Start: 2021-11-08 | End: 1900-01-01

## 2023-04-23 ENCOUNTER — RX RENEWAL (OUTPATIENT)
Age: 68
End: 2023-04-23

## 2023-04-26 ENCOUNTER — APPOINTMENT (OUTPATIENT)
Dept: INTERNAL MEDICINE | Facility: CLINIC | Age: 68
End: 2023-04-26
Payer: COMMERCIAL

## 2023-04-26 VITALS
HEART RATE: 82 BPM | OXYGEN SATURATION: 95 % | DIASTOLIC BLOOD PRESSURE: 70 MMHG | BODY MASS INDEX: 29.95 KG/M2 | TEMPERATURE: 97.6 F | SYSTOLIC BLOOD PRESSURE: 128 MMHG | WEIGHT: 169 LBS | HEIGHT: 63 IN

## 2023-04-26 DIAGNOSIS — M79.7 FIBROMYALGIA: ICD-10-CM

## 2023-04-26 DIAGNOSIS — G43.909 MIGRAINE, UNSPECIFIED, NOT INTRACTABLE, W/OUT STATUS MIGRAINOSUS: ICD-10-CM

## 2023-04-26 PROCEDURE — 99214 OFFICE O/P EST MOD 30 MIN: CPT

## 2023-04-26 NOTE — REVIEW OF SYSTEMS
[Joint Pain] : joint pain [Back Pain] : back pain [Headache] : headache [Fever] : no fever [Chest Pain] : no chest pain [Shortness Of Breath] : no shortness of breath

## 2023-04-26 NOTE — HISTORY OF PRESENT ILLNESS
[de-identified] : 67-year-old presents for follow-up and prescription renewals.\par She has a history of fibromyalgia, chronic back pain/scoliosis, asthma, paroxysmal atrial fibrillation.\par She also does complain of an occasional migraine type headache with nausea, photo/phonophobia over the past 2 years.  They are sporadic and fall no specific pattern.

## 2023-04-26 NOTE — ASSESSMENT
[FreeTextEntry1] : Chronic back pain/scoliosis/fibromyalgia–symptoms have remained relatively stable.  They do wax and wane based on her level of activity and how much she has been working.  She has continued to use hydrocodone 7.5 mg on an as-needed basis.  She is uses medication for years without any difficulty or problems in the past.\par \par Asthma–symptoms have been generally well controlled and for now she will continue with Singulair 10 mg daily and Symbicort or Advair as needed.\par \par Paroxysmal atrial fibrillation–she remains asymptomatic.  For now she is on continue verapamil  mg daily and aspirin 81 mg daily.  She does follow with electrophysiologist as well as regular cardiologist.\par \par Migraine–we discussed the symptoms sound to be very consistent with typical migraine.  There has been no change in the pattern.  Tends to be sporadic.  We did discuss medication options.\par She is going to try Imitrex 50 mg with the next headache.

## 2023-04-26 NOTE — PHYSICAL EXAM
[No Acute Distress] : no acute distress [No JVD] : no jugular venous distention [Clear to Auscultation] : lungs were clear to auscultation bilaterally [Normal Rate] : normal rate  [Regular Rhythm] : with a regular rhythm [No Murmur] : no murmur heard [Grossly Normal Strength/Tone] : grossly normal strength/tone [No Focal Deficits] : no focal deficits [Alert and Oriented x3] : oriented to person, place, and time

## 2023-06-15 ENCOUNTER — RX RENEWAL (OUTPATIENT)
Age: 68
End: 2023-06-15

## 2023-09-27 ENCOUNTER — APPOINTMENT (OUTPATIENT)
Dept: INTERNAL MEDICINE | Facility: CLINIC | Age: 68
End: 2023-09-27
Payer: COMMERCIAL

## 2023-09-27 VITALS
SYSTOLIC BLOOD PRESSURE: 108 MMHG | HEIGHT: 63 IN | WEIGHT: 165 LBS | BODY MASS INDEX: 29.23 KG/M2 | DIASTOLIC BLOOD PRESSURE: 62 MMHG

## 2023-09-27 DIAGNOSIS — M54.9 DORSALGIA, UNSPECIFIED: ICD-10-CM

## 2023-09-27 DIAGNOSIS — G89.29 DORSALGIA, UNSPECIFIED: ICD-10-CM

## 2023-09-27 DIAGNOSIS — M41.9 SCOLIOSIS, UNSPECIFIED: ICD-10-CM

## 2023-09-27 DIAGNOSIS — Z00.00 ENCOUNTER FOR GENERAL ADULT MEDICAL EXAMINATION W/OUT ABNORMAL FINDINGS: ICD-10-CM

## 2023-09-27 DIAGNOSIS — I48.0 PAROXYSMAL ATRIAL FIBRILLATION: ICD-10-CM

## 2023-09-27 DIAGNOSIS — J45.909 UNSPECIFIED ASTHMA, UNCOMPLICATED: ICD-10-CM

## 2023-09-27 PROCEDURE — 36415 COLL VENOUS BLD VENIPUNCTURE: CPT

## 2023-09-27 PROCEDURE — 99397 PER PM REEVAL EST PAT 65+ YR: CPT | Mod: 25

## 2023-09-27 RX ORDER — BUDESONIDE AND FORMOTEROL FUMARATE DIHYDRATE 160; 4.5 UG/1; UG/1
160-4.5 AEROSOL RESPIRATORY (INHALATION) TWICE DAILY
Qty: 1 | Refills: 5 | Status: ACTIVE | COMMUNITY
Start: 2021-03-30 | End: 1900-01-01

## 2023-09-28 LAB
ALBUMIN SERPL ELPH-MCNC: 4.6 G/DL
ALP BLD-CCNC: 93 U/L
ALT SERPL-CCNC: 10 U/L
ANION GAP SERPL CALC-SCNC: 12 MMOL/L
AST SERPL-CCNC: 13 U/L
BASOPHILS # BLD AUTO: 0.07 K/UL
BASOPHILS NFR BLD AUTO: 1.6 %
BILIRUB SERPL-MCNC: 0.3 MG/DL
BUN SERPL-MCNC: 9 MG/DL
CALCIUM SERPL-MCNC: 9.4 MG/DL
CHLORIDE SERPL-SCNC: 99 MMOL/L
CHOLEST SERPL-MCNC: 182 MG/DL
CO2 SERPL-SCNC: 26 MMOL/L
CREAT SERPL-MCNC: 0.47 MG/DL
EGFR: 105 ML/MIN/1.73M2
EOSINOPHIL # BLD AUTO: 0.07 K/UL
EOSINOPHIL NFR BLD AUTO: 1.6 %
GLUCOSE SERPL-MCNC: 92 MG/DL
HCT VFR BLD CALC: 39.9 %
HDLC SERPL-MCNC: 72 MG/DL
HGB BLD-MCNC: 13 G/DL
IMM GRANULOCYTES NFR BLD AUTO: 0.2 %
LDLC SERPL CALC-MCNC: 90 MG/DL
LYMPHOCYTES # BLD AUTO: 1.44 K/UL
LYMPHOCYTES NFR BLD AUTO: 33 %
MAN DIFF?: NORMAL
MCHC RBC-ENTMCNC: 30 PG
MCHC RBC-ENTMCNC: 32.6 GM/DL
MCV RBC AUTO: 92.1 FL
MONOCYTES # BLD AUTO: 0.39 K/UL
MONOCYTES NFR BLD AUTO: 8.9 %
NEUTROPHILS # BLD AUTO: 2.39 K/UL
NEUTROPHILS NFR BLD AUTO: 54.7 %
NONHDLC SERPL-MCNC: 109 MG/DL
PLATELET # BLD AUTO: 275 K/UL
POTASSIUM SERPL-SCNC: 4.4 MMOL/L
PROT SERPL-MCNC: 7 G/DL
RBC # BLD: 4.33 M/UL
RBC # FLD: 12.9 %
SODIUM SERPL-SCNC: 137 MMOL/L
T4 SERPL-MCNC: 7.5 UG/DL
TRIGL SERPL-MCNC: 109 MG/DL
TSH SERPL-ACNC: 0.83 UIU/ML
WBC # FLD AUTO: 4.37 K/UL

## 2023-10-31 ENCOUNTER — APPOINTMENT (OUTPATIENT)
Dept: INTERNAL MEDICINE | Facility: CLINIC | Age: 68
End: 2023-10-31
Payer: COMMERCIAL

## 2023-10-31 VITALS
BODY MASS INDEX: 29.23 KG/M2 | WEIGHT: 165 LBS | HEIGHT: 63 IN | DIASTOLIC BLOOD PRESSURE: 66 MMHG | SYSTOLIC BLOOD PRESSURE: 126 MMHG

## 2023-10-31 PROCEDURE — 99214 OFFICE O/P EST MOD 30 MIN: CPT

## 2023-10-31 RX ORDER — GABAPENTIN 300 MG/1
300 CAPSULE ORAL TWICE DAILY
Qty: 60 | Refills: 0 | Status: ACTIVE | COMMUNITY
Start: 2023-10-31 | End: 1900-01-01

## 2023-11-03 ENCOUNTER — APPOINTMENT (OUTPATIENT)
Dept: ORTHOPEDIC SURGERY | Facility: CLINIC | Age: 68
End: 2023-11-03
Payer: COMMERCIAL

## 2023-11-03 VITALS — HEART RATE: 84 BPM | DIASTOLIC BLOOD PRESSURE: 78 MMHG | HEIGHT: 63 IN | SYSTOLIC BLOOD PRESSURE: 128 MMHG

## 2023-11-03 DIAGNOSIS — M54.6 PAIN IN THORACIC SPINE: ICD-10-CM

## 2023-11-03 DIAGNOSIS — M60.9 MYOSITIS, UNSPECIFIED: ICD-10-CM

## 2023-11-03 PROCEDURE — 99203 OFFICE O/P NEW LOW 30 MIN: CPT

## 2023-11-03 PROCEDURE — 72080 X-RAY EXAM THORACOLMB 2/> VW: CPT

## 2023-11-13 ENCOUNTER — OUTPATIENT (OUTPATIENT)
Dept: OUTPATIENT SERVICES | Facility: HOSPITAL | Age: 68
LOS: 1 days | End: 2023-11-13
Payer: COMMERCIAL

## 2023-11-13 ENCOUNTER — APPOINTMENT (OUTPATIENT)
Dept: ULTRASOUND IMAGING | Facility: CLINIC | Age: 68
End: 2023-11-13
Payer: COMMERCIAL

## 2023-11-13 DIAGNOSIS — M79.89 OTHER SPECIFIED SOFT TISSUE DISORDERS: ICD-10-CM

## 2023-11-13 PROCEDURE — 93971 EXTREMITY STUDY: CPT

## 2023-11-13 PROCEDURE — 93971 EXTREMITY STUDY: CPT | Mod: 26,RT

## 2023-11-14 ENCOUNTER — APPOINTMENT (OUTPATIENT)
Dept: INTERNAL MEDICINE | Facility: CLINIC | Age: 68
End: 2023-11-14
Payer: COMMERCIAL

## 2023-11-14 VITALS
BODY MASS INDEX: 29.23 KG/M2 | HEIGHT: 63 IN | WEIGHT: 165 LBS | DIASTOLIC BLOOD PRESSURE: 74 MMHG | SYSTOLIC BLOOD PRESSURE: 122 MMHG

## 2023-11-14 DIAGNOSIS — M79.89 OTHER SPECIFIED SOFT TISSUE DISORDERS: ICD-10-CM

## 2023-11-14 DIAGNOSIS — M79.661 PAIN IN RIGHT LOWER LEG: ICD-10-CM

## 2023-11-14 PROCEDURE — 99214 OFFICE O/P EST MOD 30 MIN: CPT

## 2023-11-15 ENCOUNTER — APPOINTMENT (OUTPATIENT)
Dept: ULTRASOUND IMAGING | Facility: CLINIC | Age: 68
End: 2023-11-15

## 2023-11-28 ENCOUNTER — NON-APPOINTMENT (OUTPATIENT)
Age: 68
End: 2023-11-28

## 2024-01-16 ENCOUNTER — APPOINTMENT (OUTPATIENT)
Dept: ORTHOPEDIC SURGERY | Facility: CLINIC | Age: 69
End: 2024-01-16

## 2024-03-11 RX ORDER — VERAPAMIL HYDROCHLORIDE 240 MG/1
240 CAPSULE, DELAYED RELEASE PELLETS ORAL
Qty: 90 | Refills: 1 | Status: ACTIVE | COMMUNITY
Start: 2022-05-18 | End: 1900-01-01

## 2024-03-11 RX ORDER — FLUTICASONE PROPIONATE 50 UG/1
50 SPRAY, METERED NASAL DAILY
Qty: 3 | Refills: 1 | Status: ACTIVE | COMMUNITY
Start: 2021-07-21 | End: 1900-01-01

## 2024-03-11 RX ORDER — MONTELUKAST 10 MG/1
10 TABLET, FILM COATED ORAL
Qty: 90 | Refills: 1 | Status: ACTIVE | COMMUNITY
Start: 2021-01-29 | End: 1900-01-01

## 2024-03-11 RX ORDER — ALBUTEROL SULFATE 90 UG/1
108 (90 BASE) INHALANT RESPIRATORY (INHALATION)
Qty: 1 | Refills: 5 | Status: ACTIVE | COMMUNITY
Start: 2021-03-30 | End: 1900-01-01

## 2024-03-11 RX ORDER — SUMATRIPTAN 50 MG/1
50 TABLET, FILM COATED ORAL
Qty: 9 | Refills: 0 | Status: ACTIVE | COMMUNITY
Start: 2023-04-26 | End: 1900-01-01

## 2024-05-31 RX ORDER — HYDROCODONE BITARTRATE AND ACETAMINOPHEN 7.5; 325 MG/1; MG/1
7.5-325 TABLET ORAL
Qty: 120 | Refills: 0 | Status: ACTIVE | COMMUNITY
Start: 1900-01-01 | End: 1900-01-01

## 2024-06-04 ENCOUNTER — APPOINTMENT (OUTPATIENT)
Dept: INTERNAL MEDICINE | Facility: CLINIC | Age: 69
End: 2024-06-04

## 2024-07-09 ENCOUNTER — APPOINTMENT (OUTPATIENT)
Dept: INTERNAL MEDICINE | Facility: CLINIC | Age: 69
End: 2024-07-09
Payer: COMMERCIAL

## 2024-07-09 VITALS — HEIGHT: 63 IN | DIASTOLIC BLOOD PRESSURE: 68 MMHG | SYSTOLIC BLOOD PRESSURE: 112 MMHG

## 2024-07-09 DIAGNOSIS — G43.909 MIGRAINE, UNSPECIFIED, NOT INTRACTABLE, W/OUT STATUS MIGRAINOSUS: ICD-10-CM

## 2024-07-09 DIAGNOSIS — I48.0 PAROXYSMAL ATRIAL FIBRILLATION: ICD-10-CM

## 2024-07-09 DIAGNOSIS — G89.29 DORSALGIA, UNSPECIFIED: ICD-10-CM

## 2024-07-09 DIAGNOSIS — M54.9 DORSALGIA, UNSPECIFIED: ICD-10-CM

## 2024-07-09 DIAGNOSIS — M79.7 FIBROMYALGIA: ICD-10-CM

## 2024-07-09 PROCEDURE — G2211 COMPLEX E/M VISIT ADD ON: CPT | Mod: NC

## 2024-07-09 PROCEDURE — 99214 OFFICE O/P EST MOD 30 MIN: CPT

## 2024-09-30 ENCOUNTER — LABORATORY RESULT (OUTPATIENT)
Age: 69
End: 2024-09-30

## 2024-09-30 ENCOUNTER — APPOINTMENT (OUTPATIENT)
Dept: INTERNAL MEDICINE | Facility: CLINIC | Age: 69
End: 2024-09-30
Payer: COMMERCIAL

## 2024-09-30 VITALS
WEIGHT: 170 LBS | BODY MASS INDEX: 30.12 KG/M2 | DIASTOLIC BLOOD PRESSURE: 68 MMHG | HEIGHT: 63 IN | SYSTOLIC BLOOD PRESSURE: 124 MMHG

## 2024-09-30 DIAGNOSIS — Z00.00 ENCOUNTER FOR GENERAL ADULT MEDICAL EXAMINATION W/OUT ABNORMAL FINDINGS: ICD-10-CM

## 2024-09-30 DIAGNOSIS — G89.29 DORSALGIA, UNSPECIFIED: ICD-10-CM

## 2024-09-30 DIAGNOSIS — J45.909 UNSPECIFIED ASTHMA, UNCOMPLICATED: ICD-10-CM

## 2024-09-30 DIAGNOSIS — M54.9 DORSALGIA, UNSPECIFIED: ICD-10-CM

## 2024-09-30 DIAGNOSIS — I48.0 PAROXYSMAL ATRIAL FIBRILLATION: ICD-10-CM

## 2024-09-30 DIAGNOSIS — G43.909 MIGRAINE, UNSPECIFIED, NOT INTRACTABLE, W/OUT STATUS MIGRAINOSUS: ICD-10-CM

## 2024-09-30 DIAGNOSIS — M79.7 FIBROMYALGIA: ICD-10-CM

## 2024-09-30 PROCEDURE — 36415 COLL VENOUS BLD VENIPUNCTURE: CPT

## 2024-09-30 PROCEDURE — 99397 PER PM REEVAL EST PAT 65+ YR: CPT

## 2024-09-30 NOTE — HISTORY OF PRESENT ILLNESS
[de-identified] : 69-year-old female presents for annual wellness visit, follow-up fasting labs and prescription renewals. History of fibromyalgia, chronic back pain/scoliosis, asthma, occasional migraines and paroxysmal atrial fibrillation. Had recent follow-up with cardiology and is due to follow-up with electrophysiology.  Not presently on blood thinners. Has been generally well without recent illness other than ongoing complaints of generalized fatigue.  She does remain very active on a regular basis. Colonoscopy in 5/24 was a normal exam.

## 2024-09-30 NOTE — ASSESSMENT
[FreeTextEntry1] : Chronic back pain/scoliosis-no significant change in her pain pattern.  It does seem to wax and wane based on the level of her activity. Can continue with Vicodin 7.5/325 as needed.  This was renewed today.  History of migraines-has done very well with Imitrex 50 mg as needed.  Has found that she has needed it rarely recently.  This was renewed today as well.  Paroxysmal atrial fibrillation-presently on verapamil  mg daily and aspirin 81 mg daily.  Not presently on blood thinners.  Does have follow-up scheduled with electrophysiology/Dr. Gonsales  Occasional asthma-she does use Advair/albuterol as needed.

## 2024-09-30 NOTE — PHYSICAL EXAM
[No Acute Distress] : no acute distress [Clear to Auscultation] : lungs were clear to auscultation bilaterally [Regular Rhythm] : with a regular rhythm [Normal S1, S2] : normal S1 and S2 [No Murmur] : no murmur heard [No Edema] : there was no peripheral edema [No Focal Deficits] : no focal deficits [Alert and Oriented x3] : oriented to person, place, and time [de-identified] : Benign

## 2024-09-30 NOTE — HEALTH RISK ASSESSMENT
[Yes] : Yes [2 - 4 times a month (2 pts)] : 2-4 times a month (2 points) [1 or 2 (0 pts)] : 1 or 2 (0 points) [Never (0 pts)] : Never (0 points) [No] : In the past 12 months have you used drugs other than those required for medical reasons? No [0] : 2) Feeling down, depressed, or hopeless: Not at all (0) [PHQ-2 Negative - No further assessment needed] : PHQ-2 Negative - No further assessment needed [Former] : Former [5-9] : 5-9 [> 15 Years] : > 15 Years [Patient reported colonoscopy was normal] : Patient reported colonoscopy was normal [Audit-CScore] : 2 [[id number]] : 0 [ColonoscopyDate] : 05/24 [ColonoscopyComments] : 7-year follow-up

## 2024-10-01 LAB
25(OH)D3 SERPL-MCNC: 27.8 NG/ML
ALBUMIN SERPL ELPH-MCNC: 4.3 G/DL
ALP BLD-CCNC: 91 U/L
ALT SERPL-CCNC: 19 U/L
ANION GAP SERPL CALC-SCNC: 12 MMOL/L
AST SERPL-CCNC: 19 U/L
BILIRUB SERPL-MCNC: 0.2 MG/DL
BUN SERPL-MCNC: 11 MG/DL
CALCIUM SERPL-MCNC: 9 MG/DL
CHLORIDE SERPL-SCNC: 101 MMOL/L
CHOLEST SERPL-MCNC: 198 MG/DL
CO2 SERPL-SCNC: 26 MMOL/L
CORTIS SERPL-MCNC: 14.2 UG/DL
CREAT SERPL-MCNC: 0.52 MG/DL
EGFR: 102 ML/MIN/1.73M2
ESTIMATED AVERAGE GLUCOSE: 120 MG/DL
GLUCOSE SERPL-MCNC: 99 MG/DL
HBA1C MFR BLD HPLC: 5.8 %
HDLC SERPL-MCNC: 78 MG/DL
LDLC SERPL CALC-MCNC: 107 MG/DL
NONHDLC SERPL-MCNC: 120 MG/DL
POTASSIUM SERPL-SCNC: 4.6 MMOL/L
PROT SERPL-MCNC: 7 G/DL
SODIUM SERPL-SCNC: 139 MMOL/L
T4 FREE SERPL-MCNC: 1.1 NG/DL
TRIGL SERPL-MCNC: 70 MG/DL
TSH SERPL-ACNC: 1.91 UIU/ML

## 2024-10-02 LAB
BASOPHILS # BLD AUTO: 0.04 K/UL
BASOPHILS NFR BLD AUTO: 1.3 %
EOSINOPHIL # BLD AUTO: 0.2 K/UL
EOSINOPHIL NFR BLD AUTO: 6.7 %
HCT VFR BLD CALC: 40.5 %
HGB BLD-MCNC: 13 G/DL
IMM GRANULOCYTES NFR BLD AUTO: 0 %
LYMPHOCYTES # BLD AUTO: 1.11 K/UL
LYMPHOCYTES NFR BLD AUTO: 37.4 %
MAN DIFF?: NORMAL
MCHC RBC-ENTMCNC: 30.2 PG
MCHC RBC-ENTMCNC: 32.1 GM/DL
MCV RBC AUTO: 94 FL
MONOCYTES # BLD AUTO: 0.5 K/UL
MONOCYTES NFR BLD AUTO: 16.8 %
NEUTROPHILS # BLD AUTO: 1.12 K/UL
NEUTROPHILS NFR BLD AUTO: 37.8 %
PLATELET # BLD AUTO: 247 K/UL
RBC # BLD: 4.31 M/UL
RBC # FLD: 13.3 %
WBC # FLD AUTO: 2.97 K/UL

## 2025-02-28 ENCOUNTER — RX RENEWAL (OUTPATIENT)
Age: 70
End: 2025-02-28

## 2025-03-10 ENCOUNTER — APPOINTMENT (OUTPATIENT)
Dept: INTERNAL MEDICINE | Facility: CLINIC | Age: 70
End: 2025-03-10
Payer: COMMERCIAL

## 2025-03-10 VITALS
WEIGHT: 164 LBS | DIASTOLIC BLOOD PRESSURE: 72 MMHG | HEIGHT: 63 IN | SYSTOLIC BLOOD PRESSURE: 130 MMHG | BODY MASS INDEX: 29.06 KG/M2

## 2025-03-10 DIAGNOSIS — J45.909 UNSPECIFIED ASTHMA, UNCOMPLICATED: ICD-10-CM

## 2025-03-10 DIAGNOSIS — G43.909 MIGRAINE, UNSPECIFIED, NOT INTRACTABLE, W/OUT STATUS MIGRAINOSUS: ICD-10-CM

## 2025-03-10 DIAGNOSIS — M54.9 DORSALGIA, UNSPECIFIED: ICD-10-CM

## 2025-03-10 DIAGNOSIS — G89.29 DORSALGIA, UNSPECIFIED: ICD-10-CM

## 2025-03-10 DIAGNOSIS — I48.0 PAROXYSMAL ATRIAL FIBRILLATION: ICD-10-CM

## 2025-03-10 DIAGNOSIS — M79.7 FIBROMYALGIA: ICD-10-CM

## 2025-03-10 PROCEDURE — G2211 COMPLEX E/M VISIT ADD ON: CPT | Mod: NC

## 2025-03-10 PROCEDURE — 99214 OFFICE O/P EST MOD 30 MIN: CPT

## 2025-05-02 ENCOUNTER — NON-APPOINTMENT (OUTPATIENT)
Age: 70
End: 2025-05-02